# Patient Record
Sex: MALE | Race: WHITE | HISPANIC OR LATINO | Employment: FULL TIME | URBAN - METROPOLITAN AREA
[De-identification: names, ages, dates, MRNs, and addresses within clinical notes are randomized per-mention and may not be internally consistent; named-entity substitution may affect disease eponyms.]

---

## 2021-06-24 ENCOUNTER — OCCUPATIONAL MEDICINE (OUTPATIENT)
Dept: URGENT CARE | Facility: CLINIC | Age: 31
End: 2021-06-24
Payer: COMMERCIAL

## 2021-06-24 VITALS
RESPIRATION RATE: 18 BRPM | OXYGEN SATURATION: 97 % | WEIGHT: 257.2 LBS | BODY MASS INDEX: 38.09 KG/M2 | SYSTOLIC BLOOD PRESSURE: 122 MMHG | HEART RATE: 82 BPM | DIASTOLIC BLOOD PRESSURE: 88 MMHG | HEIGHT: 69 IN | TEMPERATURE: 97.7 F

## 2021-06-24 DIAGNOSIS — M62.830 SPASM OF MUSCLE OF LOWER BACK: ICD-10-CM

## 2021-06-24 DIAGNOSIS — S39.012A STRAIN OF LUMBAR REGION, INITIAL ENCOUNTER: ICD-10-CM

## 2021-06-24 PROBLEM — Z20.2 EXPOSURE TO SEXUALLY TRANSMITTED DISEASE (STD): Status: ACTIVE | Noted: 2018-05-03

## 2021-06-24 PROBLEM — A54.9 GONORRHEA: Status: ACTIVE | Noted: 2018-05-03

## 2021-06-24 PROBLEM — K35.80 ACUTE APPENDICITIS: Status: ACTIVE | Noted: 2018-09-14

## 2021-06-24 PROCEDURE — 99204 OFFICE O/P NEW MOD 45 MIN: CPT | Performed by: NURSE PRACTITIONER

## 2021-06-24 RX ORDER — CYCLOBENZAPRINE HCL 5 MG
5 TABLET ORAL 3 TIMES DAILY PRN
Qty: 30 TABLET | Refills: 0 | Status: SHIPPED | OUTPATIENT
Start: 2021-06-24 | End: 2022-07-28

## 2021-06-24 RX ORDER — KETOROLAC TROMETHAMINE 30 MG/ML
15 INJECTION, SOLUTION INTRAMUSCULAR; INTRAVENOUS ONCE
Status: COMPLETED | OUTPATIENT
Start: 2021-06-24 | End: 2021-06-24

## 2021-06-24 RX ADMIN — KETOROLAC TROMETHAMINE 15 MG: 30 INJECTION, SOLUTION INTRAMUSCULAR; INTRAVENOUS at 19:52

## 2021-06-24 ASSESSMENT — ENCOUNTER SYMPTOMS
FEVER: 0
DIZZINESS: 0
CHILLS: 0
BACK PAIN: 1
SHORTNESS OF BREATH: 0
EYE REDNESS: 0
VOMITING: 0
MYALGIAS: 0
NAUSEA: 0
SORE THROAT: 0

## 2021-06-24 NOTE — LETTER
[facility] [address] [place] [address]  Phone:  [phone] - Fax:  [phone]   [facility] Progress Report and Disability Certification  Date of Service: 6/24/2021   No Show:  No  Date / Time of Next Visit: 7/1/2021 @ 11:30 AM [facility]   Claim Information   Patient Name: Fransisco Villanueva  Claim Number:     Employer:    [employer]  Date of Injury: 6/17/2021     Insurer / TPA: [employer]  ID / SSN:     Occupation:   Diagnosis: Diagnoses of Strain of lumbar region, initial encounter and Spasm of muscle of lower back were pertinent to this visit.    Medical Information   Related to Industrial Injury? 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 He states that while moving the furniture he felt a pop in his low back on the right side. Over the past week he has been to a chiropractor a couple of times for adjustments which has provided some relief. However the past week he has not been able to sit in a chair as it is too painful. He did take ibuprofen today which provided the most relief for his pain. He denies any numbness or tingling in the lower extremities, loss of bowel or bladder. He denies previous injuries to the low back. Denies second job.   Objective Findings: Spine: No midline tenderness, step-off, deformity, no edema, erythema, ecchymosis. Tenderness to palpation to the right lumbar region causes muscle spasms elicited. +AROM with flexion and extension with pain. Straight leg is negative, gait antalgic. DTRs +2, leg strength equal bilateral. Sensation intact distally, neurovascular intact.   Pre-Existing Condition(s):     Assessment:   Initial Visit    Status: Additional Care Required  Permanent Disability:No     Plan: Medication (NOT at Work)MedicationTransfer Care  Comments:P    Diagnostics:      Comments:       Disability Information   Status: Released to Restricted Duty    From:  2021  Through: 2021 Restrictions are: Temporary   Physical Restrictions   Sittin hrs/day Standing:  < or = to 4 hrs/day Stoopin hrs/day Bendin hrs/day   Squattin hrs/day Walking:    Climbin hrs/day Pushing:      Pulling:    Other:    Reaching Above Shoulder (L):   Reaching Above Shoulder (R):       Reaching Below Shoulder (L):    Reaching Below Shoulder (R):      Not to exceed Weight Limits   Carrying(hrs):   Weight Limit(lb): < or = to 10 pounds Lifting(hrs):   Weight  Limit(lb): < or = to 10 pounds   Comments: Patient provided a Toradol injection in clinic, encouraged range of motion, heat, massage, stretching. Muscle relaxants not at work. Will place on work restrictions. Follow-up in 1 week with occupational medicine.     Repetitive Actions   Hands: i.e. Fine Manipulations from Grasping:     Feet: i.e. Operating Foot Controls:     Driving / Operate Machinery:     Provider Name:   RON Gibbons Physician Signature:  Physician Name:     Clinic Name / Location: [address] Suite [facility]  [place] [address] Clinic Phone Number: Dept: [phone]   Appointment Time: 6:45 Pm Visit Start Time: 6:49 PM   Check-In Time:  6:37 Pm Visit Discharge Time:  8:13 PM    Original-Treating Physician or Chiropractor    Page 2-Insurer/TPA    Page 3-Employer    Page 4-Employee

## 2021-06-24 NOTE — LETTER
"EMPLOYEE’S CLAIM FOR COMPENSATION/ REPORT OF INITIAL TREATMENT  FORM C-4    EMPLOYEE’S CLAIM - PROVIDE ALL INFORMATION REQUESTED   First Name  Fransisco Last Name  Rich Birthdate                    1990                Sex  male Claim Number   Home Address  [address] Age  30 y.o. Height  1.753 m (5' 9\") Weight  117 kg (257 lb 3.2 oz) [facility]     [facility] Zip  [address] Telephone  [phone] (home)    Mailing Address  [address] [facility]  [address] Primary Language Spoken  English    Insurer   Third Party   [facility]   Employee's Occupation (Job Title) When Injury or Occupational Disease Occurred      Employer's Name    [employer] Tele[employer]   [phone]   Employer Address   [address] [place] [address]   [id number]   Date of Injury  6/17/2021               Hour of Injury  10:00 AM Date Employer Notified  6/17/2021 Last Day of Work after Injury     or Occupational Disease  6/17/2021 Supervisor to Whom Injury     Reported  Cliff   Address or Location of Accident (if applicable)  [[address] [place], [place] [id number]]   What were you doing at the time of accident? (if applicable)  moving furniture     How did this injury or occupational disease occur? (Be specific an answer in detail. Use additional sheet if necessary)  moving shelves and desk, lifting furniture    If you believe that you have an occupational disease, when did you first have knowledge of the disability and it relationship to your employment?   Witnesses to the Accident  Luz Verduzco      Nature of Injury or Occupational Disease  Strain  Part(s) of Body Injured or Affected  Lower Back Area (Lumbar Area & Lumbo-Sacral), ,     I certify that the above is true and correct to the best of my knowledge and that I have provided this information in order to obtain the " benefits of [place]  I hereby authorize any physician, chiropractor, surgeon, practitioner, or other person, any hospital, including [facility] or [facility] hospital, any medical service organization, any insurance company, or other institution or organization to release to each other, any medical or other information, including benefits paid or payable, pertinent to this injury or disease, except information relative to diagnosis, treatment and/or counseling for AIDS, psychological conditions, alcohol or controlled substances, for which I must give specific authorization.  A Photostat of this authorization shall be as valid as the original.     Date   Place   Employee’s Signature   THIS REPORT MUST BE COMPLETED AND MAILED WITHIN 3 WORKING DAYS OF TREATMENT   Place  [facility]  Name of [facility]   Date  6/24/2021 Diagnosis  (S39.012A) Strain of lumbar region, initial encounter  (M62.830) Spasm of muscle of lower back Is there evidence the injured employee was under the              influence of alcohol and/or another controlled substance at the time of accident?   Hour  6:49 PM Description of Injury or Disease  Diagnoses of Strain of lumbar region, initial encounter and Spasm of muscle of lower back were pertinent to this visit. No   Treatment  Patient provided a Toradol injection in clinic, encouraged range of motion, heat, massage, stretching. Muscle relaxants not at work. Will place on work restrictions. Follow-up in 1 week with occupational medicine.   Have you advised the patient to remain off work five days or     more? No   X-Ray Findings      If Yes   From Date  To Date      From information given by the employee, together with medical evidence, can you directly connect this injury or occupational disease as job incurred?  Yes If No Full Duty    No Modified Duty  Yes    "  Is additional medical care by a physician indicated?  Yes If Modified Duty, Specify any Limitations / Restrictions  Limited sitting, walking, no bending, climbing, squatting, or extrusions less than 10 lbs.   Do you know of any previous injury or disease contributing to this condition or occupational disease?                            No   Date  6/24/2021 Print Doctor’s Name   RON Gibbons I certify the employer’s copy of  this form was mailed on:   Address  [address] [place] [address] [address] Insurer’s Use Only     [facility]  [id number]    Provider’s Tax ID Number  [id number] Telephone  Dept: [phone]      [email or web address]-JEFERSON James  Signature:     Degree          ORIGINAL-TREATING PHYSICIAN OR CHIROPRACTOR    PAGE 2-INSURER/TPA    PAGE 3-EMPLOYER    PAGE 4-EMPLOYEE        Form C-4 (rev.10/07)           BRIEF DESCRIPTION OF RIGHTS AND BENEFITS  (Pursuant to [place] 616C.050)    Notice of Injury or Occupational Disease (Incident Report Form C-1): If an injury or occupational disease (OD) arises out of and in the course of employment, you must provide written notice to your employer as soon as practicable, but no later than 7 days after the accident or OD. Your employer shall maintain a sufficient supply of the required forms.    Claim for Compensation (Form C-4): If medical treatment is sought, the form C-4 is available at the place of initial treatment. A completed \"Claim for Compensation\" (Form C-4) must be filed within 90 days after an accident or OD. The treating physician or chiropractor must, within 3 working days after treatment, complete and mail to the employer, the employer's insurer and third-party , the Claim for Compensation.    Medical Treatment: If you require medical treatment for your on-the-job injury or OD, you may be required to select a physician or chiropractor from a list provided by your workers’ compensation insurer, if it has contracted with an " Organization for Managed Care (MCO) or Preferred Provider Organization (PPO) or providers of health care. If your employer has not entered into a contract with an MCO or PPO, you may select a physician or chiropractor from the Panel of Physicians and Chiropractors. Any medical costs related to your industrial injury or OD will be paid by your insurer.    Temporary Total Disability (TTD): If your doctor has certified that you are unable to work for a period of at least 5 consecutive days, or 5 cumulative days in a 20-day period, or places restrictions on you that your employer does not accommodate, you may be entitled to TTD compensation.    Temporary Partial Disability (TPD): If the wage you receive upon reemployment is less than the compensation for TTD to which you are entitled, the insurer may be required to pay you TPD compensation to make up the difference. TPD can only be paid for a maximum of 24 months.    Permanent Partial Disability (PPD): When your medical condition is stable and there is an indication of a PPD as a result of your injury or OD, within 30 days, your insurer must arrange for an evaluation by a rating physician or chiropractor to determine the degree of your PPD. The amount of your PPD award depends on the date of injury, the results of the PPD evaluation, your age and wage.    Permanent Total Disability (PTD): If you are medically certified by a treating physician or chiropractor as permanently and totally disabled and have been granted a PTD status by your insurer, you are entitled to receive monthly benefits not to exceed 66 2/3% of your average monthly wage. The amount of your PTD payments is subject to reduction if you previously received a lump-sum PPD award.    Vocational Rehabilitation Services: You may be eligible for vocational rehabilitation services if you are unable to return to the job due to a permanent physical impairment or permanent restrictions as a result of your injury or  occupational disease.    Transportation and Per Philippe Reimbursement: You may be eligible for travel expenses and per philippe associated with medical treatment.    Reopening: You may be able to reopen your claim if your condition worsens after claim closure.     Appeal Process: If you disagree with a written determination issued by the insurer or the insurer does not respond to your request, you may appeal to the Department of Administration, , by following the instructions contained in your determination letter. You must appeal the determination within 70 days from the date of the determination letter at [address], [address], [place], [place] [address], or [address], [address], [place], [place] [address]. If you disagree with the  decision, you may appeal to the Department of Administration, . You must file your appeal within 30 days from the date of the  decision letter at [address], [address], [place], [place] [address], or [address] [facility] [address], [place], [place] [address]. If you disagree with a decision of an , you may file a petition for judicial review with the District Court. You must do so within 30 days of the Appeal Officer’s decision. You may be represented by an  at your own expense or you may contact the [facility] for possible representation.    [place]  for Injured Workers (NAIW): If you disagree with a  decision, you may request that NAIW represent you without charge at an  Hearing. For information regarding denial of benefits, you may contact the [facility] at: [address], [address] [address], [phone], or [address] [facility] [address], [place] [address], [phone]    To File a Complaint with the Division: If you wish to file a complaint with the  of the Division of Industrial Relations (DIR),  please  contact the Workers’ Compensation Section, [address], [address], [place], [place] [address], telephone [phone], or [address], [address], [place], [place] [address], telephone [phone].    For assistance with Workers’ Compensation Issues: You may contact the [facility] Office for Consumer Health Assistance, [address], [address], [place], [place] [address], Toll Free 1-373.127.3580, Web site: http://[facility].nv.gov/Programs/FISH E-mail: fish@[facility].nv.gov              __________________________________________________________________                                    _________________            Employee Name / Signature                                                                                                                            Date                                                                                                                                                                                                                              D-2 (rev. 10/20)

## 2021-06-25 NOTE — PROGRESS NOTES
"Subjective:   Fransisco Villanueva  is a 30 y.o. male who presents for Work-Related Injury (WC DOI: 06/17/21 HX having locked hips, at work moving furniture and hip/lower back pain started. Hard to movearound since then. Pt states now has a hemorrhoid related to situation.   Pt has tried to ease pain, not much is working. 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 He states that while moving the furniture he felt a pop in his low back on the right side. Over the past week he has been to a chiropractor a couple of times for adjustments which has provided some relief. However the past week he has not been able to sit in a chair as it is too painful. He did take ibuprofen today which provided the most relief for his pain. He denies any numbness or tingling in the lower extremities, loss of bowel or bladder. He denies previous injuries to the low back. Denies second job.   HPI  Review of Systems   Constitutional: Negative for chills and fever.   HENT: Negative for sore throat.    Eyes: Negative for redness.   Respiratory: Negative for shortness of breath.    Cardiovascular: Negative for chest pain.   Gastrointestinal: Negative for nausea and vomiting.   Genitourinary: Negative for dysuria.   Musculoskeletal: Positive for back pain. Negative for myalgias.   Skin: Negative for rash.   Neurological: Negative for dizziness.     Allergies   Allergen Reactions   • Pcn [Penicillins]      Slight reaction, dizziness.       Objective:   /88   Pulse 82   Temp 36.5 °C (97.7 °F)   Resp 18   Ht 1.753 m (5' 9\")   Wt 117 kg (257 lb 3.2 oz)   SpO2 97%   BMI 37.98 kg/m²   Physical Exam  Constitutional:       Appearance: Normal appearance. He is not ill-appearing or toxic-appearing.   HENT:      Head: Normocephalic.      Right Ear: External ear normal.    "   Left Ear: External ear normal.      Nose: Nose normal.      Mouth/Throat:      Lips: Pink.      Mouth: Mucous membranes are moist.   Eyes:      General: Lids are normal.         Right eye: No discharge.         Left eye: No discharge.   Pulmonary:      Effort: Pulmonary effort is normal. No accessory muscle usage or respiratory distress.   Musculoskeletal:         General: Normal range of motion.      Cervical back: Normal and full passive range of motion without pain.      Thoracic back: Normal.      Lumbar back: Spasms and tenderness present. No swelling or signs of trauma. Normal range of motion. Negative left straight leg raise test.        Back:    Skin:     Coloration: Skin is not pale.   Neurological:      Mental Status: He is alert and oriented to person, place, and time.   Psychiatric:         Mood and Affect: Mood normal.         Thought Content: Thought content normal.       Spine: No midline tenderness, step-off, deformity, no edema, erythema, ecchymosis. Tenderness to palpation to the right lumbar region causes muscle spasms elicited. +AROM with flexion and extension with pain. Straight leg is negative, gait antalgic. DTRs +2, leg strength equal bilateral. Sensation intact distally, neurovascular intact.   Assessment/Plan:     1. Strain of lumbar region, initial encounter  ketorolac (TORADOL) injection 15 mg    cyclobenzaprine (FLEXERIL) 5 mg tablet    REFERRAL TO OCCUPATIONAL MEDICINE   2. Spasm of muscle of lower back  ketorolac (TORADOL) injection 15 mg    cyclobenzaprine (FLEXERIL) 5 mg tablet    REFERRAL TO OCCUPATIONAL MEDICINE     Patient provided a Toradol injection in clinic, encouraged range of motion, heat, massage, stretching. Muscle relaxants not at work. Will place on work restrictions. Follow-up in 1 week with occupational medicine.   Differential diagnosis, natural history, supportive care, and indications for immediate follow-up discussed.

## 2021-07-01 ENCOUNTER — OCCUPATIONAL MEDICINE (OUTPATIENT)
Dept: OCCUPATIONAL MEDICINE | Facility: CLINIC | Age: 31
End: 2021-07-01
Payer: COMMERCIAL

## 2021-07-01 VITALS
WEIGHT: 260 LBS | SYSTOLIC BLOOD PRESSURE: 112 MMHG | DIASTOLIC BLOOD PRESSURE: 82 MMHG | HEIGHT: 69 IN | OXYGEN SATURATION: 95 % | TEMPERATURE: 97.6 F | HEART RATE: 85 BPM | BODY MASS INDEX: 38.51 KG/M2

## 2021-07-01 DIAGNOSIS — M62.830 SPASM OF MUSCLE OF LOWER BACK: ICD-10-CM

## 2021-07-01 DIAGNOSIS — S39.012D STRAIN OF LUMBAR REGION, SUBSEQUENT ENCOUNTER: ICD-10-CM

## 2021-07-01 PROCEDURE — 99213 OFFICE O/P EST LOW 20 MIN: CPT | Performed by: NURSE PRACTITIONER

## 2021-07-01 NOTE — PROGRESS NOTES
Subjective:     Fransisco Villanueva is a 30 y.o. 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 Patient seen in urgent care x 1 for this injury. Today significant improvement since the start of the injury.  He states originally sitting was very painful.  He states he still gets an occasional sharp shooting pain from the right lower back down to his thigh, but this is sporadic in nature.  He has not had any continued issues with muscle spasms.  Denies leg weakness, saddle anesthesia, or bowel or bladder changes.  He states he has taken an Epson salt bath with moderate improvement in symptoms.  He is also applying ice and heat.  He has been using ibuprofen as needed.  He states he took the muscle relaxer with varied relief, he is no longer taking at this time.  He has been doing some stretching exercises with moderate improvement.  He feels that the Toradol injection has been very helpful.  He is applying ice and heat with moderate relief.  He has been using Biofreeze with moderate relief.  He states he is tolerating light duty without difficulty.  He has been able to sit, take the stairs, and stand with less discomfort.  Plan of care discussed with patient.    ROS: All systems were reviewed on intake form, form was reviewed and signed. See scanned documents in media. Pertinent positives and negatives included in HPI.    PMH: No pertinent past medical history to this problem  MEDS: Medications were reviewed in Epic  ALLERGIES:   Allergies   Allergen Reactions   • Pcn [Penicillins]      Slight reaction, dizziness.      SOCHX: Works as  at [employer]  FH: No pertinent family history to this problem       Objective:     /82   Pulse 85   Temp 36.4  "°C (97.6 °F)   Ht 1.753 m (5' 9\")   Wt 118 kg (260 lb)   SpO2 95%   BMI 38.40 kg/m²     [unfilled]    Lumbar: No gross deformity noted.  Very mild tenderness to right paraspinal musculature L3-S1.  Negative muscle spasms appreciated.  Negative TTP to the SI joint's bilaterally.  Neg decreased flexion or rotation.  Subjective discomfort with rotation to the right.  Straight leg test negative bilaterally.  Able to heel/toe walk with minimal difficulty. Cranial nerves grossly intact.  Achilles and patellar reflexes 2+ bilaterally.   Sensation intact. No gait abnormalities       Assessment/Plan:       1. Strain of lumbar region, subsequent encounter    2. Spasm of muscle of lower back    Released to Restricted Duty FROM 7/1/2021 TO 7/8/2021  Recommend rotating sitting, standing, and walking needed for comfort.  Follow-up in 1 week  Restricted duty  Recommend OTC Tylenol/ibuprofen, ice/heat application, continue with Biofreeze as needed, and gentle range of motion and stretching exercises as demonstrated.    Pt understands return to the ED immediately for im  mediate reevaluation for increased pain, fever, chills, numbness/weakness of lower extremities, or incontinence of bowel or bladder.    Differential diagnosis, natural history, supportive care, and indications for immediate follow-up discussed.    Approximately 25 minutes were spent in reviewing notes, preparing for visit, obtaining history, exam and evaluation, patient counseling/education and post visit documentation/orders.  "

## 2021-07-01 NOTE — LETTER
[address],   [address] [place] [address]  Phone:  [phone] - Fax:  [phone]   Occupational Health [facility] Progress Report and Disability Certification  Date of Service: 7/1/2021   No Show:  No  Date / Time of Next Visit: 7/8/2021 @ 11:45 AM    Claim Information   Patient Name: Fransisco Villanueva  Claim Number:     Employer:   [employer] Date of Injury: 6/17/2021     Insurer / TPA: [employer]  ID / SSN:     Occupation:   Diagnosis: Diagnoses of Strain of lumbar region, subsequent encounter and Spasm of muscle of lower back were pertinent to this visit.    Medical Information   Related to Industrial Injury? 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 Patient seen in urgent care x 1 for this injury. Today significant improvement since the start of the injury.  He states originally sitting was very painful.  He states he still gets an occasional sharp shooting pain from the right lower back down to his thigh, but this is sporadic in nature.  He has not had any continued issues with muscle spasms.  Denies leg weakness, saddle anesthesia, or bowel or bladder changes.  He states he has taken an Epson salt bath with moderate improvement in symptoms.  He is also applying ice and heat.  He has been using ibuprofen as needed.  He states he took the muscle relaxer with varied relief, he is no longer taking at this time.  He has been doing some stretching exercises with moderate improvement.  He feels that the Toradol injection has been very helpful.  He is applying ice and heat with moderate relief.  He has been using Biofreeze with moderate relief.  He states he is tolerating light duty without difficulty.  He has been able to sit,  take the stairs, and stand with less discomfort.  Plan of care discussed with patient.   Objective Findings: Lumbar: No gross deformity noted.  Very mild tenderness to right paraspinal musculature L3-S1.  Negative muscle spasms appreciated.  Negative TTP to the SI joint's bilaterally.  Neg decreased flexion or rotation.  Subjective discomfort with rotation to the right.  Straight leg test negative bilaterally.  Able to heel/toe walk with minimal difficulty. Cranial nerves grossly intact.  Achilles and patellar reflexes 2+ bilaterally.   Sensation intact. No gait abnormalities      Pre-Existing Condition(s):     Assessment:   Condition Improved    Status: Additional Care Required  Permanent Disability:No    Plan:      Diagnostics:      Comments:  Follow-up in 1 week  Restricted duty  Recommend OTC Tylenol/ibuprofen, ice/heat application, continue with Biofreeze as needed, and gentle range of motion and stretching exercises as demonstrated.    Pt understands return to the ED immediately for im  mediate reevaluation for increased pain, fever, chills, numbness/weakness of lower extremities, or incontinence of bowel or bladder.    Disability Information   Status: Released to Restricted Duty    From:  7/1/2021  Through: 7/8/2021 Restrictions are: Temporary   Physical Restrictions   Sitting:    Standing:    Stooping:  < or = to 1 hr/day Bending:  < or = to 1 hr/day   Squatting:    Walking:    Climbing:    Pushing:      Pulling:    Other:    Reaching Above Shoulder (L):   Reaching Above Shoulder (R):       Reaching Below Shoulder (L):    Reaching Below Shoulder (R):      Not to exceed Weight Limits   Carrying(hrs):   Weight Limit(lb): < or = to 10 pounds Lifting(hrs):   Weight  Limit(lb): < or = to 10 pounds   Comments: Recommend rotating sitting, standing, and walking needed for comfort.    Repetitive Actions   Hands: i.e. Fine Manipulations from Grasping:     Feet: i.e. Operating Foot Controls:     Driving / Operate  Machinery:     Provider Name:   RON Avila Physician Signature:  Physician Name:     Clinic Name / Location: [address],   [address]  [place] [address] Clinic Phone Number: Dept: [phone]   Appointment Time: 11:30 Am Visit Start Time: 11:29 AM   Check-In Time:  11:28 Am Visit Discharge Time: 12 PM   Original-Treating Physician or Chiropractor    Page 2-Insurer/TPA    Page 3-Employer    Page 4-Employee

## 2021-07-13 ENCOUNTER — OCCUPATIONAL MEDICINE (OUTPATIENT)
Dept: OCCUPATIONAL MEDICINE | Facility: CLINIC | Age: 31
End: 2021-07-13
Payer: COMMERCIAL

## 2021-07-13 VITALS
TEMPERATURE: 97.8 F | SYSTOLIC BLOOD PRESSURE: 122 MMHG | OXYGEN SATURATION: 96 % | HEIGHT: 69 IN | DIASTOLIC BLOOD PRESSURE: 78 MMHG | HEART RATE: 80 BPM | RESPIRATION RATE: 18 BRPM | WEIGHT: 258 LBS | BODY MASS INDEX: 38.21 KG/M2

## 2021-07-13 DIAGNOSIS — M62.830 SPASM OF MUSCLE OF LOWER BACK: ICD-10-CM

## 2021-07-13 DIAGNOSIS — S39.012D STRAIN OF LUMBAR REGION, SUBSEQUENT ENCOUNTER: ICD-10-CM

## 2021-07-13 PROCEDURE — 99213 OFFICE O/P EST LOW 20 MIN: CPT | Performed by: NURSE PRACTITIONER

## 2021-07-13 ASSESSMENT — ENCOUNTER SYMPTOMS
CARDIOVASCULAR NEGATIVE: 1
MYALGIAS: 0
NEUROLOGICAL NEGATIVE: 1
PSYCHIATRIC NEGATIVE: 1
RESPIRATORY NEGATIVE: 1
BACK PAIN: 0
CONSTITUTIONAL NEGATIVE: 1

## 2021-07-13 NOTE — LETTER
[address],   [address] [place] [address]  Phone:  [phone] - Fax:  [phone]   Occupational Health [facility] Progress Report and Disability Certification  Date of Service: 7/13/2021   No Show:  No  Date / Time of Next Visit:   Discharged/MMI  Released to full duty   Claim Information   Patient Name: Fransisco Villanueva  Claim Number:     Employer:   [employer] Date of Injury: 6/17/2021     Insurer / TPA: [employer]  ID / SSN:     Occupation:   Diagnosis: Diagnoses of Strain of lumbar region, subsequent encounter and Spasm of muscle of lower back were pertinent to this visit.    Medical Information   Related to Industrial Injury? 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 Patient seen in urgent care x 1 for this injury. Today significant improvement since the start of the injury.  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 He no longer has occasional sharp shooting pain from the right lower back down to his thigh.  He states he has been able to take the stairs at work without difficulty or pain.  He has not had any continued issues with muscle spasms.  Denies leg weakness, saddle anesthesia, or bowel or bladder changes.  He took one dose of muscle relaxer but stopped taking it because he did not like the way it made him feel.  Today he states he did take Tylenol and ibuprofen.  He is also applying ice and heat.  He states he purchased deep tissue massage which has been very helpful.  He is planning on going back to see his chiropractor at this time.  He has been doing some stretching exercises with moderate improvement. He has been using Biofreeze with moderate relief.  He states he is  tolerating light duty without difficulty.  He feels that he can return to work full duty.  Plan of care discussed with patient.   Objective Findings: Lumbar: No gross deformity noted.  Very mild tenderness to right paraspinal musculature L3-S1.  Negative muscle spasms appreciated.  Negative TTP to the SI joint's bilaterally.  Neg decreased flexion or rotation.  Negative discomfort with rotation to the right.  Straight leg test negative bilaterally.  Able to heel/toe walk with minimal difficulty. Cranial nerves grossly intact.  Achilles and patellar reflexes 2+ bilaterally.   Sensation intact. No gait abnormalities, but feels like he has some unevenness in the hip.    Pre-Existing Condition(s):     Assessment:   Condition Improved    Status: Discharged /  MMI  Permanent Disability:No    Plan:      Diagnostics:      Comments:  Discharged/MMI  Released to full duty  Follow-up if needed    Disability Information   Status: Released to Full Duty    From:  7/13/2021  Through:   Restrictions are:     Physical Restrictions   Sitting:    Standing:    Stooping:    Bending:      Squatting:    Walking:    Climbing:    Pushing:      Pulling:    Other:    Reaching Above Shoulder (L):   Reaching Above Shoulder (R):       Reaching Below Shoulder (L):    Reaching Below Shoulder (R):      Not to exceed Weight Limits   Carrying(hrs):   Weight Limit(lb):   Lifting(hrs):   Weight  Limit(lb):     Comments:      Repetitive Actions   Hands: i.e. Fine Manipulations from Grasping:     Feet: i.e. Operating Foot Controls:     Driving / Operate Machinery:     Provider Name:   RON Avila Physician Signature:  Physician Name:     Clinic Name / Location: [place] [address]  [place] [address] Clinic Phone Number: Dept: [phone]   Appointment Time: 4:30 Pm Visit Start Time: 4:49 PM   Check-In Time:  4:44 Pm Visit Discharge Time:  5:09pm   Original-Treating Physician or Chiropractor    Page  2-Insurer/TPA    Page 3-Employer    Page 4-Employee

## 2021-07-14 NOTE — PROGRESS NOTES
Subjective:     Fransisco Villanueva is a 30 y.o. 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 Patient seen in urgent care x 1 for this injury. Today significant improvement since the start of the injury.  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 He no longer has occasional sharp shooting pain from the right lower back down to his thigh.  He states he has been able to take the stairs at work without difficulty or pain.  He has not had any continued issues with muscle spasms.  Denies leg weakness, saddle anesthesia, or bowel or bladder changes.  He took one dose of muscle relaxer but stopped taking it because he did not like the way it made him feel.  Today he states he did take Tylenol and ibuprofen.  He is also applying ice and heat.  He states he purchased deep tissue massage which has been very helpful.  He is planning on going back to see his chiropractor at this time.  He has been doing some stretching exercises with moderate improvement. He has been using Biofreeze with moderate relief.  He states he is tolerating light duty without difficulty.  He feels that he can return to work full duty.  Plan of care discussed with patient.    Review of Systems   Constitutional: Negative.    Respiratory: Negative.    Cardiovascular: Negative.    Musculoskeletal: Negative for back pain and myalgias.        Feels like his hip is uneven, but usually sees a Chiropractor for this issue   Skin: Negative.    Neurological: Negative.    Psychiatric/Behavioral: Negative.        SOCH[employer]: Works as  at [employer]  FH: No pertinent family history to this problem       Objective:     /78   Pulse 80   Temp 36.6 °C (97.8 °F)    "Resp 18   Ht 1.753 m (5' 9\")   Wt 117 kg (258 lb)   SpO2 96%   BMI 38.10 kg/m²     Constitutional: Patient is in no acute distress. Appears well-developed and well-nourished.   Cardiovascular: Normal rate.    Pulmonary/Chest: Effort normal. No respiratory distress.   Neurological: Patient is alert and oriented to person, place, and time.   Skin: Skin is warm and dry.   Psychiatric: Normal mood and affect. Behavior is normal.     Lumbar: No gross deformity noted.  Very mild tenderness to right paraspinal musculature L3-S1.  Negative muscle spasms appreciated.  Negative TTP to the SI joint's bilaterally.  Neg decreased flexion or rotation.  Negative discomfort with rotation to the right.  Straight leg test negative bilaterally.  Able to heel/toe walk with minimal difficulty. Cranial nerves grossly intact.  Achilles and patellar reflexes 2+ bilaterally.   Sensation intact. No gait abnormalities, but feels like he has some unevenness in the hip.     Assessment/Plan:       1. Strain of lumbar region, subsequent encounter    2. Spasm of muscle of lower back    Released to Full Duty FROM 7/13/2021 TO         Discharged/MMI  Released to full duty  Follow-up if needed    Differential diagnosis, natural history, supportive care, and indications for immediate follow-up discussed.    Approximately 25 minutes was spent in preparing for visit, obtaining history, exam and evaluation, patient counseling/education and post visit documentation/orders.  "

## 2022-07-28 ENCOUNTER — HOSPITAL ENCOUNTER (EMERGENCY)
Facility: MEDICAL CENTER | Age: 32
End: 2022-07-28
Attending: EMERGENCY MEDICINE
Payer: COMMERCIAL

## 2022-07-28 VITALS
TEMPERATURE: 97.8 F | SYSTOLIC BLOOD PRESSURE: 130 MMHG | WEIGHT: 262.35 LBS | DIASTOLIC BLOOD PRESSURE: 83 MMHG | BODY MASS INDEX: 38.86 KG/M2 | RESPIRATION RATE: 17 BRPM | HEART RATE: 93 BPM | OXYGEN SATURATION: 92 % | HEIGHT: 69 IN

## 2022-07-28 DIAGNOSIS — R22.0 FACIAL SWELLING: ICD-10-CM

## 2022-07-28 DIAGNOSIS — L50.9 URTICARIA: ICD-10-CM

## 2022-07-28 DIAGNOSIS — T78.40XA ALLERGIC REACTION, INITIAL ENCOUNTER: ICD-10-CM

## 2022-07-28 PROCEDURE — 700111 HCHG RX REV CODE 636 W/ 250 OVERRIDE (IP): Performed by: EMERGENCY MEDICINE

## 2022-07-28 PROCEDURE — 99284 EMERGENCY DEPT VISIT MOD MDM: CPT

## 2022-07-28 PROCEDURE — 96374 THER/PROPH/DIAG INJ IV PUSH: CPT

## 2022-07-28 PROCEDURE — 96375 TX/PRO/DX INJ NEW DRUG ADDON: CPT

## 2022-07-28 RX ORDER — DIPHENHYDRAMINE HYDROCHLORIDE 50 MG/ML
50 INJECTION INTRAMUSCULAR; INTRAVENOUS ONCE
Status: COMPLETED | OUTPATIENT
Start: 2022-07-28 | End: 2022-07-28

## 2022-07-28 RX ORDER — DEXAMETHASONE SODIUM PHOSPHATE 4 MG/ML
10 INJECTION, SOLUTION INTRA-ARTICULAR; INTRALESIONAL; INTRAMUSCULAR; INTRAVENOUS; SOFT TISSUE ONCE
Status: COMPLETED | OUTPATIENT
Start: 2022-07-28 | End: 2022-07-28

## 2022-07-28 RX ADMIN — DEXAMETHASONE SODIUM PHOSPHATE 10 MG: 4 INJECTION, SOLUTION INTRA-ARTICULAR; INTRALESIONAL; INTRAMUSCULAR; INTRAVENOUS; SOFT TISSUE at 21:13

## 2022-07-28 RX ADMIN — DIPHENHYDRAMINE HYDROCHLORIDE 50 MG: 50 INJECTION INTRAMUSCULAR; INTRAVENOUS at 21:13

## 2022-07-29 NOTE — ED TRIAGE NOTES
Pt arrived to ED with complaints of facial edema, hives and generalized itching that started approx 2030 this pm. He states he did eat some apricot this afternoon and is concerned that this is the cause of his allergy as he as other fruit allergies.     +obvious mouth and lip edema, +hives to trunk. No SOB or wheezing. Pt to ED room and chart up for ERP.

## 2022-07-29 NOTE — ED PROVIDER NOTES
"ER Provider Note     Scribed for Rajeev Garcia M.D. by Destin Bush. 7/28/2022, 9:08 PM.    Primary Care Provider: Pcp Pt States None  Means of Arrival: Walk-in   History obtained from: Patient  History limited by: None     CHIEF COMPLAINT  Chief Complaint   Patient presents with   • Facial Swelling   • Hives       HPI  Fransisco Villanueva is a 31 y.o. male who presents to the Emergency Department for evaluation of facial swelling onset prior to arrival. Patient reports a history of apricot allergies. He is experiencing associated hives, oral dryness, and describes his tung as feeling \"weird\". He deneis any swelling or tightness to the back of mouth or tung. He also denies any nausea or dizziness. He adds that he does not have an epi pen at home currently.     REVIEW OF SYSTEMS  See HPI for further details. All other systems are negative.     PAST MEDICAL HISTORY   has a past medical history of Depression.    SURGICAL HISTORY   has a past surgical history that includes appendectomy.    SOCIAL HISTORY  Social History     Tobacco Use   • Smoking status: Never Smoker   • Smokeless tobacco: Never Used   Vaping Use   • Vaping Use: Never used   Substance Use Topics   • Alcohol use: Yes     Alcohol/week: 9.6 oz     Types: 16 Cans of beer per week   • Drug use: Yes     Types: Inhaled     Comment: THC      Social History     Substance and Sexual Activity   Drug Use Yes   • Types: Inhaled    Comment: THC       FAMILY HISTORY  History reviewed. No pertinent family history.    CURRENT MEDICATIONS  Home Medications     Reviewed by Keyonna Denis R.N. (Registered Nurse) on 07/28/22 at 2058  Med List Status: Complete   Medication Last Dose Status   BUPROPION HCL ER, SR, PO  Active   Non Formulary Request  Active   SERTRALINE HCL PO  Active   Sildenafil Citrate (VIAGRA PO)  Active                ALLERGIES  Allergies   Allergen Reactions   • Food      Kiwi, [place], pineapple   • Pcn [Penicillins]      Slight reaction, " "dizziness.        PHYSICAL EXAM  VITAL SIGNS: BP (!) 132/91   Pulse 83   Temp 36.2 °C (97.1 °F) (Temporal)   Resp 16   Ht 1.753 m (5' 9\")   Wt 119 kg (262 lb 5.6 oz)   SpO2 95%   BMI 38.74 kg/m²      Constitutional: Alert in no apparent distress.  HENT: No signs of trauma, Bilateral external ears normal, Nose normal, No swelling of posterior oral pharynx, swelling of lips  Eyes: Pupils are equal and reactive, Conjunctiva normal, Non-icteric.   Neck: Normal range of motion, No tenderness, Supple, No stridor.   Lymphatic: No lymphadenopathy noted.   Cardiovascular: Regular rate and rhythm, no palpable thrill  Thorax & Lungs: No respiratory distress,  No chest tenderness.  CTAB  Abdomen: Bowel sounds normal, Soft, No tenderness, No masses, No pulsatile masses. No peritoneal signs.  Skin: Warm, Dry, No erythema, No rash, Hives around belt region of abdomen  Back: No bony tenderness, No CVA tenderness.   Extremities: Intact distal pulses, No edema, No tenderness, No cyanosis.  Musculoskeletal: Good range of motion in all major joints. No tenderness to palpation or major deformities noted. ,   Neurologic: Alert , Normal motor function, Normal sensory function, No focal deficits noted.   Psychiatric: Affect normal, Judgment normal, Mood normal.     COURSE & MEDICAL DECISION MAKING  Pertinent Labs & Imaging studies reviewed. (See chart for details)    This is a 31 y.o. male that presents with a case of borderline allergic reaction versus anaphylaxis.  At this time the patient does not meet criteria for epinephrine.  We will give the patient Benadryl as well as steroids in the IV and observe closely..     9:08 PM - Patient seen and examined at bedside. I informed patient that he will be treated for an allergic reaction, and he needs an epi pen at home. Patient will be medicated with Decadron 10 mg injection and Benadryl 50 mg injection for his symptoms.     9:50 PM - Patient was reevaluated at bedside. Patient's rash " is gone and his lips have decreased in swelling. I discuss discharge with patient. He is comfortable with discharge at this time.      The patient is responding well to therapy.  Epinephrine is not needed.  We will send the patient home with an EpiPen and strict return precautions as well as follow-up.    CRITICAL CARE  The very real possibilty of a deterioration of this patient's condition required the highest level of my preparedness for sudden, emergent intervention.  I provided critical care services, which included medication orders, frequent reevaluations of the patient's condition and response to treatment, ordering and reviewing test results, and discussing the case with various consultants.  The critical care time associated with the care of the patient was 35 minutes. Review chart for interventions. This time is exclusive of any other billable procedures.       The patient will return for new or worsening symptoms and is stable at the time of discharge.    DISPOSITION:  Patient will be discharged home in stable condition.    FOLLOW UP:  Told to follow-up with primary care provider.    OUTPATIENT MEDICATIONS:  Discharge Medication List as of 7/28/2022 10:12 PM      START taking these medications    Details   EPINEPHrine 0.3 MG/0.3ML Solution Prefilled Syringe Inject the contents of the epipen into the thigh, hold for 3 seconds and release from thigh., Disp-2 Each, R-0, Normal               FINAL IMPRESSION  1. Urticaria    2. Facial swelling    3. Allergic reaction, initial encounter          Destin ADEN (Zafaribgonzalo), am scribing for, and in the presence of, Rajeev Garcia M.D..    Electronically signed by: Destin Bush (Judson), 7/28/2022    Rajeev ADEN M.D. personally performed the services described in this documentation, as scribed by Destin Bush in my presence, and it is both accurate and complete.     The note accurately reflects work and decisions made by me.  Rajeev Garcia  M.D.  7/29/2022  3:35 AM